# Patient Record
(demographics unavailable — no encounter records)

---

## 2017-12-30 NOTE — RAD
PA AND LATERAL VIEWS CHEST:

 

HISTORY:

Cough.

 

FINDINGS:

The heart size is normal.  The lungs are expanded without focal areas of consolidation, pneumothorax,
 or pleural effusions.  There are degenerative changes of the spine.

 

IMPRESSION:  

No radiographic evidence of acute cardiopulmonary process.

 

POS: [facility]